# Patient Record
Sex: MALE | Race: WHITE | NOT HISPANIC OR LATINO | Employment: FULL TIME | URBAN - METROPOLITAN AREA
[De-identification: names, ages, dates, MRNs, and addresses within clinical notes are randomized per-mention and may not be internally consistent; named-entity substitution may affect disease eponyms.]

---

## 2024-01-16 ENCOUNTER — OFFICE VISIT (OUTPATIENT)
Dept: PRIMARY CARE | Facility: CLINIC | Age: 59
End: 2024-01-16
Payer: COMMERCIAL

## 2024-01-16 VITALS
WEIGHT: 185 LBS | HEIGHT: 72 IN | SYSTOLIC BLOOD PRESSURE: 124 MMHG | DIASTOLIC BLOOD PRESSURE: 68 MMHG | RESPIRATION RATE: 12 BRPM | HEART RATE: 76 BPM | BODY MASS INDEX: 25.06 KG/M2

## 2024-01-16 DIAGNOSIS — Z00.00 ROUTINE GENERAL MEDICAL EXAMINATION AT A HEALTH CARE FACILITY: Primary | ICD-10-CM

## 2024-01-16 PROCEDURE — 99396 PREV VISIT EST AGE 40-64: CPT | Performed by: FAMILY MEDICINE

## 2024-01-16 PROCEDURE — 1036F TOBACCO NON-USER: CPT | Performed by: FAMILY MEDICINE

## 2024-01-16 NOTE — PROGRESS NOTES
No concerns today. pt has regular dental visits. no vision problems. no hearing loss.   Lifestyle: healthy diet. no weight concerns. Pt exercises regularly.   he does not use tobacco. he denies alcohol abuse.   Reproductive health: the patient is sexually active. no erectile dysfunction. patient is not at high risk for prostate cancer.   Safety elements used: seat belt, safe driving habits and smoke detector.   no passive smoke exposure, safe sexual behavior, no chemical abuse, no domestic violence, no anxiety symptoms, no depression symptoms, safe driving habits, no driving violations, no history of DUI and no tuberculosis exposure.     Review of Systems  Constitutional: no chills, no fever and no night sweats.   Eyes: no blurred vision and no eyesight problems.   ENT: no hearing loss, no nasal congestion, no nasal discharge, no hoarseness and no sore throat.   Neck: no mass(es) and no swelling.   Cardiovascular: no chest pain, no intermittent leg claudication, no lower extremity edema, no palpitations and no syncope.   Respiratory: no cough, no shortness of breath during exertion, no shortness of breath at rest and no wheezing.   Gastrointestinal: no abdominal pain, no constipation, no blood in stools, no diarrhea, no melena, no nausea, no rectal pain and no vomiting.   Genitourinary: no dysuria, no change in urinary frequency, no genital lesions, no hematuria, no urinary hesitancy, no incontinence, no nocturia, no lump, no sexual dysfunction, no testicular pain and no feelings of urinary urgency.   Musculoskeletal: no arthralgias, no back pain, no localized joint pain, no myalgias and no neck pain. rt rt heel pain for a few mos  Integumentary: no new skin lesions, no rashes and no skin wound.   Neurological: no confusion, no convulsions, no difficulty walking, no dizziness, no headache, no limb weakness, no memory changes, no numbness, no speech difficulties, no syncope and no tingling.   Psychiatric: no anxiety,  no personality change, no depression, no emotional problems, no homicidal thoughts, no anhedonia, no sleep disturbances and no substance use disorders.   Endocrine: no changes in appetite, no deepening of the voice, no polyuria, no feelings of weakness, no heat/cold intolerance, no muscle weakness, no polydipsia, no recent weight gain and no recent weight loss.   Hematologic/Lymphatic: no tendency for easy bleeding, no tendency for easy bruising, no recurrent infections and no swollen glands.     Physical Exam  Constitutional: Alert and in no acute distress. Well developed, well nourished.   Head and Face: Head and face: Normal.  Palpation of the face and sinuses: Normal.    Eyes: Normal external exam. Pupils: PERRLA, EOMI.   Ears, Nose, Mouth, and Throat: External inspection of ears and nose: Normal.  Hearing: Normal.  Nasal mucosa, septum, and turbinates: Normal.  Oropharynx: Normal.    Neck: No neck mass was observed. Supple. Thyroid not enlarged and there were no palpable thyroid nodules.   Cardiovascular: Heart rate and rhythm were normal, normal S1 and S2, no gallops, no murmurs and no pericardial rub. Pedal pulses: Normal. No peripheral edema.   Pulmonary: No respiratory distress. Clear bilateral breath sounds.   Chest: Chest: Normal.    Abdomen: Soft nontender; no abdominal mass palpated. No organomegaly. No hernias.   Musculoskeletal: Gait and station: Normal. No joint swelling seen, normal movements of all extremities. Range of motion: Normal.  Muscle strength/tone: Normal.    Skin: Normal skin color and pigmentation, normal skin turgor, and no rash. Palpation of skin and subcutaneous tissue: Normal.    Neurologic: Cranial nerves 2-12 grossly intact. Deep tendon reflexes were 2+ and symmetric. Sensation: Normal. Coordination: Normal.    Psychiatric: Judgment and insight: Intact. Alert and oriented x 3. Recent and remote memory: Normal.  Mood and affect: Normal.   Lymphatic: No cervical lymphadenopathy.  Palpation of lymph nodes in axillae: Normal.      Unremarkable PE, Recommend DASH diet and regular exercise. check CBC, CMP, lipid, TSH.  Advise eye exam by an OD yearly and dental exam every 6 months. will monitor lipid and weight yearly. Recommend Hep C, hep B, HIV test. recommend   flu, covid  shot if not done yet.  We will call pt regarding lab results. f/u as scheduled. Stretch exercise for rt heel pain. Call office if symptoms do not resolve in 30 days

## 2024-01-18 ENCOUNTER — LAB (OUTPATIENT)
Dept: LAB | Facility: LAB | Age: 59
End: 2024-01-18
Payer: COMMERCIAL

## 2024-01-18 DIAGNOSIS — Z00.00 ROUTINE GENERAL MEDICAL EXAMINATION AT A HEALTH CARE FACILITY: ICD-10-CM

## 2024-01-18 LAB
ALBUMIN SERPL BCP-MCNC: 4.4 G/DL (ref 3.4–5)
ALP SERPL-CCNC: 54 U/L (ref 33–136)
ALT SERPL W P-5'-P-CCNC: 26 U/L (ref 10–52)
ANION GAP SERPL CALC-SCNC: 9 MMOL/L (ref 10–20)
AST SERPL W P-5'-P-CCNC: 25 U/L (ref 9–39)
BILIRUB SERPL-MCNC: 0.5 MG/DL (ref 0–1.2)
BUN SERPL-MCNC: 14 MG/DL (ref 6–23)
CALCIUM SERPL-MCNC: 8.9 MG/DL (ref 8.6–10.3)
CHLORIDE SERPL-SCNC: 105 MMOL/L (ref 98–107)
CHOLEST SERPL-MCNC: 173 MG/DL (ref 0–199)
CHOLESTEROL/HDL RATIO: 2.2
CO2 SERPL-SCNC: 29 MMOL/L (ref 21–32)
CREAT SERPL-MCNC: 0.96 MG/DL (ref 0.5–1.3)
EGFRCR SERPLBLD CKD-EPI 2021: 90 ML/MIN/1.73M*2
ERYTHROCYTE [DISTWIDTH] IN BLOOD BY AUTOMATED COUNT: 12 % (ref 11.5–14.5)
EST. AVERAGE GLUCOSE BLD GHB EST-MCNC: 114 MG/DL
GLUCOSE SERPL-MCNC: 99 MG/DL (ref 74–99)
HBA1C MFR BLD: 5.6 %
HCT VFR BLD AUTO: 46 % (ref 41–52)
HDLC SERPL-MCNC: 79 MG/DL
HGB BLD-MCNC: 15.3 G/DL (ref 13.5–17.5)
LDLC SERPL CALC-MCNC: 81 MG/DL
MCH RBC QN AUTO: 32.5 PG (ref 26–34)
MCHC RBC AUTO-ENTMCNC: 33.3 G/DL (ref 32–36)
MCV RBC AUTO: 98 FL (ref 80–100)
NON HDL CHOLESTEROL: 94 MG/DL (ref 0–149)
NRBC BLD-RTO: 0 /100 WBCS (ref 0–0)
PLATELET # BLD AUTO: 257 X10*3/UL (ref 150–450)
POTASSIUM SERPL-SCNC: 4.2 MMOL/L (ref 3.5–5.3)
PROT SERPL-MCNC: 6.4 G/DL (ref 6.4–8.2)
RBC # BLD AUTO: 4.71 X10*6/UL (ref 4.5–5.9)
SODIUM SERPL-SCNC: 139 MMOL/L (ref 136–145)
TRIGL SERPL-MCNC: 64 MG/DL (ref 0–149)
TSH SERPL-ACNC: 1.13 MIU/L (ref 0.44–3.98)
VLDL: 13 MG/DL (ref 0–40)
WBC # BLD AUTO: 7.3 X10*3/UL (ref 4.4–11.3)

## 2024-01-18 PROCEDURE — 36415 COLL VENOUS BLD VENIPUNCTURE: CPT

## 2024-01-18 PROCEDURE — 80053 COMPREHEN METABOLIC PANEL: CPT

## 2024-01-18 PROCEDURE — 84443 ASSAY THYROID STIM HORMONE: CPT

## 2024-01-18 PROCEDURE — 83036 HEMOGLOBIN GLYCOSYLATED A1C: CPT

## 2024-01-18 PROCEDURE — 85027 COMPLETE CBC AUTOMATED: CPT

## 2024-01-18 PROCEDURE — 80061 LIPID PANEL: CPT
